# Patient Record
Sex: MALE
[De-identification: names, ages, dates, MRNs, and addresses within clinical notes are randomized per-mention and may not be internally consistent; named-entity substitution may affect disease eponyms.]

---

## 2018-06-22 ENCOUNTER — HOSPITAL ENCOUNTER (EMERGENCY)
Dept: HOSPITAL 75 - ER | Age: 48
Discharge: HOME | End: 2018-06-22
Payer: SELF-PAY

## 2018-06-22 VITALS — WEIGHT: 190 LBS | HEIGHT: 68 IN | BODY MASS INDEX: 28.79 KG/M2

## 2018-06-22 VITALS — SYSTOLIC BLOOD PRESSURE: 132 MMHG | DIASTOLIC BLOOD PRESSURE: 87 MMHG

## 2018-06-22 DIAGNOSIS — N39.0: Primary | ICD-10-CM

## 2018-06-22 LAB
APTT PPP: YELLOW S
BACTERIA #/AREA URNS HPF: (no result) /HPF
BILIRUB UR QL STRIP: NEGATIVE
FIBRINOGEN PPP-MCNC: (no result) MG/DL
GLUCOSE UR STRIP-MCNC: NEGATIVE MG/DL
KETONES UR QL STRIP: NEGATIVE
LEUKOCYTE ESTERASE UR QL STRIP: (no result)
NITRITE UR QL STRIP: NEGATIVE
PH UR STRIP: 6 [PH] (ref 5–9)
PROT UR QL STRIP: (no result)
SP GR UR STRIP: 1.01 (ref 1.02–1.02)
UROBILINOGEN UR-MCNC: 4 MG/DL
WBC #/AREA URNS HPF: (no result) /HPF

## 2018-06-22 PROCEDURE — 87491 CHLMYD TRACH DNA AMP PROBE: CPT

## 2018-06-22 PROCEDURE — 87591 N.GONORRHOEAE DNA AMP PROB: CPT

## 2018-06-22 PROCEDURE — 99283 EMERGENCY DEPT VISIT LOW MDM: CPT

## 2018-06-22 PROCEDURE — 87077 CULTURE AEROBIC IDENTIFY: CPT

## 2018-06-22 PROCEDURE — 87186 SC STD MICRODIL/AGAR DIL: CPT

## 2018-06-22 PROCEDURE — 87088 URINE BACTERIA CULTURE: CPT

## 2018-06-22 PROCEDURE — 81000 URINALYSIS NONAUTO W/SCOPE: CPT

## 2018-06-22 PROCEDURE — 36415 COLL VENOUS BLD VENIPUNCTURE: CPT

## 2018-06-22 NOTE — ED GU-MALE
General


Chief Complaint:  -Male


Stated Complaint:  BURNING WITH URINATION


Nursing Triage Note:  


STATES BURNING/BLOOD WITH URINATION


Source:  patient


Exam Limitations:  no limitations





History of Present Illness


Date Seen by Provider:  Jun 22, 2018


Time Seen by Provider:  16:25


Initial Comments


Here with complaint of dysuria and frequency.  Started several days ago when he 

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 

cannot leave the office.  He stated he felt like he had to go but when he 

finally did go he did not urinate very much.  Since then he's had multiple 

frequent episodes of urination of only small amounts with burning when the 

urine is coming out and at the end.  Has not been sexually active for a few 

months.  Denies fever or chills.  Denies nausea or vomiting.


Timing/Duration:  getting worse, other (4 days)


Severity/Quality:  moderate, burning


Location:  urethral


Radiation:  suprapubic


Activities at Onset:  none


Prior Genitourinary Problems:  none


Sexual [place] History:  greater than 2 months ago, single partner


Modifying Factors:  Worsens With Urinating


Associated Symptoms:  No diaphoresis; dysuria; No fever/chills, No lower back 

pain, No nausea/vomiting, No urinary frequency





Allergies and Home Medications


Allergies


Coded Allergies:  


     No Known Drug Allergies (Unverified , 1/25/14)





Home Medications


Hydrocodone Bit/Acetaminophen 1 Each Tablet, 1-2 TAB PO Q4H PRN for PAIN


   Prescribed by: CARLITA RUSH on 3/21/14 1019





Patient Home Medication List


Home Medication List Reviewed:  Yes





Review of Systems


Constitutional:  see HPI; No chills, No fever


Respiratory:  No short of breath, No wheezing


Cardiovascular:  No chest pain, No edema


Gastrointestinal:  abdominal pain (mild suprapubic); No nausea, No vomiting


Genitourinary:  dysuria, hematuria (intermittently), pain, urgency


Musculoskeletal:  back pain (intermittent)


Skin:  no symptoms reported


All Other Systemes Reviewed


Negative Unless Noted:  Yes





Past Medical-Social-Family Hx


Past Med/Social Hx:  Reviewed Nursing Past Med/Soc Hx


Patient Social History


Alcohol Use:  Occasionally Uses


Recreational Drug Use:  No


Smoking Status:  Never a Smoker


Recent Foreign Travel:  No


Contact w/Someone Who Travel:  No


Recent Infectious Disease Expo:  No





Past Medical History


Surgeries:  No


Respiratory:  No


Cardiac:  No


Neurological:  No


Reproductive Disorders:  Yes (PHIMOSIS)


Gastrointestinal:  No


Musculoskeletal:  No


Endocrine:  No


Psychosocial:  No





Family Medical History


Reviewed Nursing Family Hx


No Pertinent Family Hx





Physical Exam


Vital Signs





Vital Signs - First Documented








 6/22/18





 16:30


 


Temp 97.2


 


Pulse 78


 


Resp 20


 


B/P (MAP) 140/109 (119)


 


Pulse Ox 97


 


O2 Delivery Room Air





Capillary Refill : Less Than 3 Seconds


General Appearance:  WD/WN, no apparent distress


Cardiovascular:  regular rate, rhythm, no murmur


Respiratory:  lungs clear, normal breath sounds


Gastrointestinal:  soft; No distended, No guarding; tenderness (mild suprapubic 

tenderness on palpation but otherwise negative)


Back:  normal inspection, no CVA tenderness, no vertebral tenderness


Neurologic/Psychiatric:  alert, oriented x 3


Skin:  normal color, warm/dry





Progress/Results/Core Measures


Suspected Sepsis


Recent Fever Within 48 Hours:  No


Infection Criteria Present:  None


New/Unexplained  Altered Menta:  No


Sepsis Screen:  No Definite Risk


SIRS


Temperature:97.2 


Pulse: 78 


Respiratory Rate: 20


 


Blood Pressure 140 /109 


Mean: 119





Results/Orders


Lab Results





Laboratory Tests








Test


 6/22/18


16:33 Range/Units


 


 


Urine Color YELLOW   


 


Urine Clarity VERY CLOUDY H  


 


Urine pH 6  5-9  


 


Urine Specific Gravity 1.010 L 1.016-1.022  


 


Urine Protein 3+ H NEGATIVE  


 


Urine Glucose (UA) NEGATIVE  NEGATIVE  


 


Urine Ketones NEGATIVE  NEGATIVE  


 


Urine Nitrite NEGATIVE  NEGATIVE  


 


Urine Bilirubin NEGATIVE  NEGATIVE  


 


Urine Urobilinogen 4 H NORMAL  MG/DL


 


Urine Leukocyte Esterase 3+ H NEGATIVE  


 


Urine RBC (Auto) 5+ H NEGATIVE  


 


Urine RBC 10-25 H  /HPF


 


Urine WBC TNTC H  /HPF


 


Urine Crystals NONE   /LPF


 


Urine Bacteria LARGE H  /HPF


 


Urine Casts NONE   /LPF


 


Urine Mucus NEGATIVE   /LPF


 


Urine Culture Indicated YES   








My Orders





Orders - VIC WILLIS MD


Ua Culture If Indicated (6/22/18 16:21)


Urine Culture (6/22/18 16:33)


Neis Shalom Dna Urine Test (6/22/18 16:48)


Chlam Dna Probe (6/22/18 16:48)





Vital Signs/I&O











 6/22/18





 16:30


 


Temp 97.2


 


Pulse 78


 


Resp 20


 


B/P (MAP) 140/109 (119)


 


Pulse Ox 97


 


O2 Delivery Room Air





Capillary Refill : Less Than 3 Seconds








Blood Pressure Mean:  119








Progress Note :  


Progress Note


Seen and evaluated.  UA ordered.  We will check urine for chlamydia and 

gonorrhea as well.  Monitor patient.  1730: UA positive.  Patient does work as 

a  and admits to dehydration.  We did discuss proper hydration for both 

prevention and for treatment.  Discharged home with return precautions.  

Patient verbalize understanding instructions and agreement with plan.





Departure


Impression





 Primary Impression:  


 Urinary tract infection


 Qualified Codes:  N30.01 - Acute cystitis with hematuria


Disposition:  01 HOME, SELF-CARE


Condition:  Stable





Departure-Patient Inst.


Decision time for Depature:  17:35


Referrals:  


NO,LOCAL PHYSICIAN (PCP/Family)


Primary Care Physician


Patient Instructions:  Urinary Tract Infection, Adult (DC)





Add. Discharge Instructions:  


All discharge instructions reviewed with patient and/or family. Voiced 

understanding.





Take medications as directed.  You may take ibuprofen 600 mg every 8 hours as 

needed for pain.  You may take Tylenol/acetaminophen 1000 mg every 8 hours as 

needed for pain.  Drink plenty of fluids.  Follow-up with your Dr. in a few 

days for recheck.  Return for worse pain, fever, vomiting, weakness or 

breathing problems or other concerns as needed.


Scripts


Phenazopyridine HCl (Pyridium) 100 Mg Tablet


100 MG PO Q8H PRN for PAIN-MILD TO MODERATE, #6 TAB 0 Refills


   Prov: VIC WILLIS MD         6/22/18 


Cephalexin (Cephalexin) 500 Mg Tablet


500 MG PO BID, #14 TAB 0 Refills


   Prov: VIC WILLIS MD         6/22/18











VIC WILLIS MD Jun 22, 2018 16:48